# Patient Record
Sex: FEMALE | Race: WHITE | Employment: UNEMPLOYED | URBAN - METROPOLITAN AREA
[De-identification: names, ages, dates, MRNs, and addresses within clinical notes are randomized per-mention and may not be internally consistent; named-entity substitution may affect disease eponyms.]

---

## 2017-01-23 ENCOUNTER — OFFICE VISIT (OUTPATIENT)
Dept: OBGYN | Age: 27
End: 2017-01-23

## 2017-01-23 VITALS
SYSTOLIC BLOOD PRESSURE: 126 MMHG | WEIGHT: 234 LBS | DIASTOLIC BLOOD PRESSURE: 82 MMHG | BODY MASS INDEX: 39.95 KG/M2 | HEIGHT: 64 IN

## 2017-01-23 DIAGNOSIS — N92.6 IRREGULAR PERIODS/MENSTRUAL CYCLES: ICD-10-CM

## 2017-01-23 DIAGNOSIS — Z11.51 SCREENING FOR HUMAN PAPILLOMAVIRUS: ICD-10-CM

## 2017-01-23 DIAGNOSIS — Z01.419 WELL FEMALE EXAM WITH ROUTINE GYNECOLOGICAL EXAM: Primary | ICD-10-CM

## 2017-01-23 LAB — PAP SMEAR: NORMAL

## 2017-01-23 PROCEDURE — 99395 PREV VISIT EST AGE 18-39: CPT | Performed by: OBSTETRICS & GYNECOLOGY

## 2017-01-23 RX ORDER — NORGESTIMATE AND ETHINYL ESTRADIOL 0.25-0.035
1 KIT ORAL DAILY
Qty: 1 PACKET | Refills: 3 | Status: SHIPPED | OUTPATIENT
Start: 2017-01-23 | End: 2017-06-06 | Stop reason: SDUPTHER

## 2017-01-23 ASSESSMENT — ENCOUNTER SYMPTOMS
ABDOMINAL PAIN: 0
RECTAL PAIN: 0
ALLERGIC/IMMUNOLOGIC NEGATIVE: 1
VOMITING: 0
ANAL BLEEDING: 0
ABDOMINAL DISTENTION: 0
EYES NEGATIVE: 1
BLOOD IN STOOL: 0
CONSTIPATION: 0
DIARRHEA: 0
NAUSEA: 0
RESPIRATORY NEGATIVE: 1

## 2017-02-02 DIAGNOSIS — Z11.51 SCREENING FOR HUMAN PAPILLOMAVIRUS: ICD-10-CM

## 2017-02-02 DIAGNOSIS — Z01.419 WELL FEMALE EXAM WITH ROUTINE GYNECOLOGICAL EXAM: ICD-10-CM

## 2017-06-06 RX ORDER — NORGESTIMATE AND ETHINYL ESTRADIOL 0.25-0.035
1 KIT ORAL DAILY
Qty: 3 PACKET | Refills: 3 | Status: SHIPPED | OUTPATIENT
Start: 2017-06-06 | End: 2018-03-15 | Stop reason: SDUPTHER

## 2018-01-25 RX ORDER — NORGESTIMATE AND ETHINYL ESTRADIOL 0.25-0.035
1 KIT ORAL DAILY
Qty: 84 TABLET | Refills: 0 | OUTPATIENT
Start: 2018-01-25

## 2018-03-15 RX ORDER — NORGESTIMATE AND ETHINYL ESTRADIOL 0.25-0.035
1 KIT ORAL DAILY
Qty: 84 TABLET | Refills: 0 | Status: SHIPPED | OUTPATIENT
Start: 2018-03-15 | End: 2018-04-23 | Stop reason: SDUPTHER

## 2018-04-23 ENCOUNTER — OFFICE VISIT (OUTPATIENT)
Dept: OBGYN CLINIC | Age: 28
End: 2018-04-23
Payer: COMMERCIAL

## 2018-04-23 VITALS
WEIGHT: 212 LBS | SYSTOLIC BLOOD PRESSURE: 124 MMHG | BODY MASS INDEX: 36.19 KG/M2 | HEIGHT: 64 IN | DIASTOLIC BLOOD PRESSURE: 74 MMHG

## 2018-04-23 DIAGNOSIS — Z11.51 SPECIAL SCREENING EXAMINATION FOR HUMAN PAPILLOMAVIRUS (HPV): ICD-10-CM

## 2018-04-23 DIAGNOSIS — Z01.419 WOMEN'S ANNUAL ROUTINE GYNECOLOGICAL EXAMINATION: Primary | ICD-10-CM

## 2018-04-23 PROCEDURE — 99395 PREV VISIT EST AGE 18-39: CPT | Performed by: OBSTETRICS & GYNECOLOGY

## 2018-04-23 RX ORDER — NORGESTIMATE AND ETHINYL ESTRADIOL 0.25-0.035
1 KIT ORAL DAILY
Qty: 84 TABLET | Refills: 3 | Status: SHIPPED | OUTPATIENT
Start: 2018-04-23 | End: 2019-02-20 | Stop reason: SDUPTHER

## 2018-04-23 ASSESSMENT — ENCOUNTER SYMPTOMS
EYES NEGATIVE: 1
VOMITING: 0
ABDOMINAL PAIN: 0
CONSTIPATION: 0
ANAL BLEEDING: 0
NAUSEA: 0
DIARRHEA: 0
ALLERGIC/IMMUNOLOGIC NEGATIVE: 1
RESPIRATORY NEGATIVE: 1
BLOOD IN STOOL: 0
ABDOMINAL DISTENTION: 0
RECTAL PAIN: 0

## 2018-05-02 DIAGNOSIS — Z11.51 SPECIAL SCREENING EXAMINATION FOR HUMAN PAPILLOMAVIRUS (HPV): ICD-10-CM

## 2018-05-02 DIAGNOSIS — Z01.419 WOMEN'S ANNUAL ROUTINE GYNECOLOGICAL EXAMINATION: ICD-10-CM

## 2019-02-20 RX ORDER — NORGESTIMATE AND ETHINYL ESTRADIOL 0.25-0.035
1 KIT ORAL DAILY
Qty: 84 TABLET | Refills: 0 | Status: SHIPPED | OUTPATIENT
Start: 2019-02-20 | End: 2019-05-01 | Stop reason: SDUPTHER

## 2019-05-01 ENCOUNTER — OFFICE VISIT (OUTPATIENT)
Dept: OBGYN CLINIC | Age: 29
End: 2019-05-01
Payer: COMMERCIAL

## 2019-05-01 VITALS
HEIGHT: 64 IN | WEIGHT: 191 LBS | DIASTOLIC BLOOD PRESSURE: 72 MMHG | BODY MASS INDEX: 32.61 KG/M2 | SYSTOLIC BLOOD PRESSURE: 116 MMHG

## 2019-05-01 DIAGNOSIS — Z11.51 SCREENING FOR HUMAN PAPILLOMAVIRUS: ICD-10-CM

## 2019-05-01 DIAGNOSIS — Z01.419 WOMEN'S ANNUAL ROUTINE GYNECOLOGICAL EXAMINATION: Primary | ICD-10-CM

## 2019-05-01 PROCEDURE — 99395 PREV VISIT EST AGE 18-39: CPT | Performed by: OBSTETRICS & GYNECOLOGY

## 2019-05-01 RX ORDER — NORGESTIMATE AND ETHINYL ESTRADIOL 0.25-0.035
1 KIT ORAL DAILY
Qty: 84 TABLET | Refills: 3 | Status: SHIPPED | OUTPATIENT
Start: 2019-05-01 | End: 2019-06-19 | Stop reason: SDUPTHER

## 2019-05-01 ASSESSMENT — PATIENT HEALTH QUESTIONNAIRE - PHQ9
1. LITTLE INTEREST OR PLEASURE IN DOING THINGS: 0
SUM OF ALL RESPONSES TO PHQ QUESTIONS 1-9: 0
2. FEELING DOWN, DEPRESSED OR HOPELESS: 0
SUM OF ALL RESPONSES TO PHQ9 QUESTIONS 1 & 2: 0
SUM OF ALL RESPONSES TO PHQ QUESTIONS 1-9: 0

## 2019-05-01 ASSESSMENT — ENCOUNTER SYMPTOMS
EYES NEGATIVE: 1
RESPIRATORY NEGATIVE: 1
ANAL BLEEDING: 0
NAUSEA: 0
DIARRHEA: 0
RECTAL PAIN: 0
CONSTIPATION: 0
ALLERGIC/IMMUNOLOGIC NEGATIVE: 1
BLOOD IN STOOL: 0
ABDOMINAL DISTENTION: 0
ABDOMINAL PAIN: 0
VOMITING: 0

## 2019-05-01 NOTE — PROGRESS NOTES
Subjective:      Patient ID: Tiffany Torres is a 34 y.o. female    Annual exam.  No GYN complaints. Normal cycles. Pap performed. STD screening offered. Monthly SBE encouraged. F/U annual or prn. Vitals:  /72   Ht 5' 4\" (1.626 m)   Wt 191 lb (86.6 kg)   LMP 04/01/2019   BMI 32.79 kg/m²   Past Medical History:   Diagnosis Date    Migraines      Past Surgical History:   Procedure Laterality Date    LAPAROSCOPY  [id number]    DR [facility] TONSILLECTOMY       Allergies:  Ciprofloxacin  Current Outpatient Medications   Medication Sig Dispense Refill    norgestimate-ethinyl estradiol (ORTHO-CYCLEN) 0.25-35 MG-MCG per tablet Take 1 tablet by mouth daily 84 tablet 3    butalbital-acetaminophen-caffeine-codeine (FIORICET WITH CODEINE) -35-30 MG per capsule       betamethasone dipropionate (DIPROLENE) 0.05 % cream       halobetasol (ULTRAVATE) 0.05 % cream        No current facility-administered medications for this visit. Social History     Socioeconomic History    Marital status:      Spouse name: Not on file    Number of children: Not on file    Years of education: Not on file    Highest education level: Not on file   Occupational History    Not on file   Social Needs    Financial resource strain: Not on file    Food insecurity:     Worry: Not on file     Inability: Not on file    Transportation needs:     Medical: Not on file     Non-medical: Not on file   Tobacco Use    Smoking status: Former Smoker    Smokeless tobacco: Never Used   Substance and Sexual Activity    Alcohol use:  Yes     Alcohol/week: 0.0 oz     Comment: rarely    Drug use: No     Comment: Electronic cigs    Sexual activity: Yes     Partners: Male     Comment: Aygestin   Lifestyle    Physical activity:     Days per week: Not on file     Minutes per session: Not on file    Stress: Not on file   Relationships    Social connections:     Talks on phone: Not on file     Gets together: Not on file Attends Oriental orthodox service: Not on file     Active member of club or organization: Not on file     Attends meetings of clubs or organizations: Not on file     Relationship status: Not on file    Intimate partner violence:     Fear of current or ex partner: Not on file     Emotionally abused: Not on file     Physically abused: Not on file     Forced sexual activity: Not on file   Other Topics Concern    Not on file   Social History Narrative    Not on file     Family History   Problem Relation Age of Onset    Breast Cancer Mother     Breast Cancer Maternal Aunt     Cancer Maternal Aunt         bone    Cervical Cancer Maternal Aunt     Breast Cancer Maternal Grandmother     No Known Problems Paternal Grandfather     No Known Problems Paternal Grandmother     No Known Problems Maternal Grandfather     No Known Problems Father     No Known Problems Brother     No Known Problems Sister     No Known Problems Other     Colon Cancer Neg Hx     Diabetes Neg Hx     Eclampsia Neg Hx     Hypertension Neg Hx     Ovarian Cancer Neg Hx      Labor Neg Hx     Spont Abortions Neg Hx     Stroke Neg Hx        Review of Systems   Constitutional: Negative. Negative for activity change, appetite change, chills, diaphoresis, fatigue, fever and unexpected weight change. HENT: Negative. Eyes: Negative. Respiratory: Negative. Cardiovascular: Negative. Gastrointestinal: Negative for abdominal distention, abdominal pain, anal bleeding, blood in stool, constipation, diarrhea, nausea, rectal pain and vomiting. Endocrine: Negative. Genitourinary: Negative for decreased urine volume, difficulty urinating, dyspareunia, dysuria, enuresis, flank pain, frequency, genital sores, hematuria, menstrual problem, pelvic pain, urgency, vaginal bleeding, vaginal discharge and vaginal pain. Musculoskeletal: Negative. Skin: Negative. Allergic/Immunologic: Negative. Neurological: Negative. to person, place, and time. Skin: Skin is warm and dry. No rash noted. No erythema. No pallor. Psychiatric: She has a normal mood and affect. Her behavior is normal. Judgment and thought content normal.       Assessment:       Diagnosis Orders   1. Women's annual routine gynecological examination  PAP SMEAR   2. Screening for human papillomavirus          Plan:      Medications placedthis encounter:  Orders Placed This Encounter   Medications    norgestimate-ethinyl estradiol (ORTHO-CYCLEN) 0.25-35 MG-MCG per tablet     Sig: Take 1 tablet by mouth daily     Dispense:  84 tablet     Refill:  3         Orders placedthis encounter:  Orders Placed This Encounter   Procedures    PAP SMEAR     Standing Status:   Future     Standing Expiration Date:   5/1/2020     Order Specific Question:   Collection Type     Answer: Thin Prep     Order Specific Question:   Prior Abnormal Pap Test     Answer:   No     Order Specific Question:   Screening or Diagnostic     Answer:   Screening     Order Specific Question:   HPV Requested?      Answer:   Yes     Order Specific Question:   High Risk Patient     Answer:   N/A         Follow up:  Return in about 1 year (around 5/1/2020) for Annual.

## 2019-05-01 NOTE — PROGRESS NOTES
The patient was asked if she would like a chaperone present for her intimate exam. She  Declined the chaperone.  Talita Gusman

## 2019-05-10 DIAGNOSIS — Z01.419 WOMEN'S ANNUAL ROUTINE GYNECOLOGICAL EXAMINATION: ICD-10-CM

## 2019-05-10 DIAGNOSIS — Z11.51 SCREENING FOR HUMAN PAPILLOMAVIRUS: ICD-10-CM

## 2019-05-10 NOTE — LETTER
[facility] [employer] [address]  Phone: [phone]  Fax: [phone]    Uma Castellanos MD        May 13, 2019    [address] [address]      Dear Marya Rojas:    The results of your most recent Pap smear are normal. This means that no cancerous or precancerous cells were seen. We recommend that you come back in 1 year for your next routine Pap smear. If you have any questions or concerns, please don't hesitate to call.     Sincerely,        Uma Castellanos MD

## 2019-06-19 RX ORDER — NORGESTIMATE AND ETHINYL ESTRADIOL 0.25-0.035
1 KIT ORAL DAILY
Qty: 84 TABLET | Refills: 3 | Status: SHIPPED | OUTPATIENT
Start: 2019-06-19 | End: 2020-10-28

## 2020-08-15 ENCOUNTER — HOSPITAL ENCOUNTER (EMERGENCY)
Age: 30
Discharge: HOME | End: 2020-08-15
Attending: EMERGENCY MEDICINE
Payer: MEDICAID

## 2020-08-15 ENCOUNTER — APPOINTMENT (OUTPATIENT)
Dept: ULTRASOUND IMAGING | Age: 30
End: 2020-08-15
Payer: MEDICAID

## 2020-08-15 VITALS
HEART RATE: 109 BPM | OXYGEN SATURATION: 100 % | SYSTOLIC BLOOD PRESSURE: 103 MMHG | DIASTOLIC BLOOD PRESSURE: 75 MMHG | RESPIRATION RATE: 19 BRPM | HEIGHT: 64 IN | BODY MASS INDEX: 32.44 KG/M2 | WEIGHT: 190 LBS | TEMPERATURE: 98.2 F

## 2020-08-15 LAB
ABO/RH: NORMAL
ALBUMIN SERPL-MCNC: 4.3 G/DL (ref 3.5–4.6)
ALP BLD-CCNC: 70 U/L (ref 40–130)
ALT SERPL-CCNC: 10 U/L (ref 0–33)
ANION GAP SERPL CALCULATED.3IONS-SCNC: 11 MEQ/L (ref 9–15)
ANTIBODY SCREEN: NORMAL
AST SERPL-CCNC: 11 U/L (ref 0–35)
BACTERIA: NEGATIVE /HPF
BASOPHILS ABSOLUTE: 0.1 K/UL (ref 0–0.2)
BASOPHILS RELATIVE PERCENT: 1 %
BILIRUB SERPL-MCNC: <0.2 MG/DL (ref 0.2–0.7)
BILIRUBIN URINE: NEGATIVE
BLOOD, URINE: ABNORMAL
BUN BLDV-MCNC: 9 MG/DL (ref 6–20)
CALCIUM SERPL-MCNC: 8.8 MG/DL (ref 8.5–9.9)
CHLORIDE BLD-SCNC: 102 MEQ/L (ref 95–107)
CLARITY: CLEAR
CO2: 24 MEQ/L (ref 20–31)
COLOR: ABNORMAL
CREAT SERPL-MCNC: 0.56 MG/DL (ref 0.5–0.9)
EOSINOPHILS ABSOLUTE: 0.1 K/UL (ref 0–0.7)
EOSINOPHILS RELATIVE PERCENT: 0.8 %
EPITHELIAL CELLS, UA: ABNORMAL /HPF (ref 0–5)
GFR AFRICAN AMERICAN: >60
GFR NON-AFRICAN AMERICAN: >60
GLOBULIN: 2.4 G/DL (ref 2.3–3.5)
GLUCOSE BLD-MCNC: 99 MG/DL (ref 70–99)
GLUCOSE URINE: NEGATIVE MG/DL
GONADOTROPIN, CHORIONIC (HCG) QUANT: 1684 MIU/ML
HCT VFR BLD CALC: 42 % (ref 37–47)
HEMOGLOBIN: 14.3 G/DL (ref 12–16)
HYALINE CASTS: ABNORMAL /HPF (ref 0–5)
KETONES, URINE: NEGATIVE MG/DL
LEUKOCYTE ESTERASE, URINE: ABNORMAL
LYMPHOCYTES ABSOLUTE: 2.3 K/UL (ref 1–4.8)
LYMPHOCYTES RELATIVE PERCENT: 29.1 %
MCH RBC QN AUTO: 28.9 PG (ref 27–31.3)
MCHC RBC AUTO-ENTMCNC: 34.2 % (ref 33–37)
MCV RBC AUTO: 84.6 FL (ref 82–100)
MONOCYTES ABSOLUTE: 0.6 K/UL (ref 0.2–0.8)
MONOCYTES RELATIVE PERCENT: 6.9 %
NEUTROPHILS ABSOLUTE: 5 K/UL (ref 1.4–6.5)
NEUTROPHILS RELATIVE PERCENT: 62.2 %
NITRITE, URINE: NEGATIVE
PDW BLD-RTO: 13.2 % (ref 11.5–14.5)
PH UA: 6 (ref 5–9)
PLATELET # BLD: 246 K/UL (ref 130–400)
POTASSIUM SERPL-SCNC: 4 MEQ/L (ref 3.4–4.9)
PROTEIN UA: ABNORMAL MG/DL
RBC # BLD: 4.96 M/UL (ref 4.2–5.4)
RBC UA: ABNORMAL /HPF (ref 0–5)
SODIUM BLD-SCNC: 137 MEQ/L (ref 135–144)
SPECIFIC GRAVITY UA: 1 (ref 1–1.03)
TOTAL PROTEIN: 6.7 G/DL (ref 6.3–8)
UROBILINOGEN, URINE: 0.2 E.U./DL
WBC # BLD: 8 K/UL (ref 4.8–10.8)
WBC UA: ABNORMAL /HPF (ref 0–5)

## 2020-08-15 PROCEDURE — 99284 EMERGENCY DEPT VISIT MOD MDM: CPT

## 2020-08-15 PROCEDURE — 80053 COMPREHEN METABOLIC PANEL: CPT

## 2020-08-15 PROCEDURE — 86900 BLOOD TYPING SEROLOGIC ABO: CPT

## 2020-08-15 PROCEDURE — 85025 COMPLETE CBC W/AUTO DIFF WBC: CPT

## 2020-08-15 PROCEDURE — 81001 URINALYSIS AUTO W/SCOPE: CPT

## 2020-08-15 PROCEDURE — 86901 BLOOD TYPING SEROLOGIC RH(D): CPT

## 2020-08-15 PROCEDURE — 86850 RBC ANTIBODY SCREEN: CPT

## 2020-08-15 PROCEDURE — 76801 OB US < 14 WKS SINGLE FETUS: CPT

## 2020-08-15 PROCEDURE — 76817 TRANSVAGINAL US OBSTETRIC: CPT

## 2020-08-15 PROCEDURE — 84702 CHORIONIC GONADOTROPIN TEST: CPT

## 2020-08-15 PROCEDURE — 36415 COLL VENOUS BLD VENIPUNCTURE: CPT

## 2020-08-15 RX ORDER — 0.9 % SODIUM CHLORIDE 0.9 %
1000 INTRAVENOUS SOLUTION INTRAVENOUS ONCE
Status: DISCONTINUED | OUTPATIENT
Start: 2020-08-15 | End: 2020-08-15 | Stop reason: HOSPADM

## 2020-08-15 RX ORDER — ACETAMINOPHEN 500 MG
1000 TABLET ORAL ONCE
Status: DISCONTINUED | OUTPATIENT
Start: 2020-08-15 | End: 2020-08-15 | Stop reason: HOSPADM

## 2020-08-15 ASSESSMENT — PAIN DESCRIPTION - ORIENTATION: ORIENTATION: LOWER

## 2020-08-15 ASSESSMENT — ENCOUNTER SYMPTOMS
COUGH: 0
SHORTNESS OF BREATH: 0
NAUSEA: 0
ABDOMINAL PAIN: 1
SORE THROAT: 0
VOMITING: 0
BACK PAIN: 0
DIARRHEA: 0

## 2020-08-15 ASSESSMENT — PAIN DESCRIPTION - LOCATION: LOCATION: ABDOMEN

## 2020-08-15 ASSESSMENT — PAIN SCALES - GENERAL: PAINLEVEL_OUTOF10: 5

## 2020-08-15 ASSESSMENT — PAIN DESCRIPTION - PAIN TYPE: TYPE: ACUTE PAIN

## 2020-08-15 ASSESSMENT — PAIN DESCRIPTION - DESCRIPTORS: DESCRIPTORS: CRAMPING

## 2020-08-15 NOTE — ED PROVIDER NOTES
 Cancer Maternal Aunt         bone    Cervical Cancer Maternal Aunt     Breast Cancer Maternal Grandmother     No Known Problems Paternal Grandfather     No Known Problems Paternal Grandmother     No Known Problems Maternal Grandfather     No Known Problems Father     No Known Problems Brother     No Known Problems Sister     No Known Problems Other     Colon Cancer Neg Hx     Diabetes Neg Hx     Eclampsia Neg Hx     Hypertension Neg Hx     Ovarian Cancer Neg Hx      Labor Neg Hx     Spont Abortions Neg Hx     Stroke Neg Hx           SOCIAL HISTORY       Social History     Socioeconomic History    Marital status:      Spouse name: None    Number of children: None    Years of education: None    Highest education level: None   Occupational History    None   Social Needs    Financial resource strain: None    Food insecurity     Worry: None     Inability: None    Transportation needs     Medical: None     Non-medical: None   Tobacco Use    Smoking status: Former Smoker     Types: Cigarettes    Smokeless tobacco: Former User   Substance and Sexual Activity    Alcohol use:  Yes     Alcohol/week: 0.0 standard drinks     Comment: rarely    Drug use: No     Comment: Electronic cigs    Sexual activity: Yes     Partners: Male     Comment: Aygestin   Lifestyle    Physical activity     Days per week: None     Minutes per session: None    Stress: None   Relationships    Social connections     Talks on phone: None     Gets together: None     Attends Muslim service: None     Active member of club or organization: None     Attends meetings of clubs or organizations: None     Relationship status: None    Intimate partner violence     Fear of current or ex partner: None     Emotionally abused: None     Physically abused: None     Forced sexual activity: None   Other Topics Concern    None   Social History Narrative    None         PHYSICAL EXAM       ED Triage Vitals [08/15/20 1602]   BP Temp Temp src Pulse Resp SpO2 Height Weight   103/75 98.2 °F (36.8 °C) -- 109 19 100 % 5' 4\" (1.626 m) 190 lb (86.2 kg)       Physical Exam  Vitals signs and nursing note reviewed. Constitutional:       Appearance: She is well-developed. HENT:      Head: Normocephalic. Right Ear: External ear normal.      Left Ear: External ear normal.   Eyes:      Conjunctiva/sclera: Conjunctivae normal.      Pupils: Pupils are equal, round, and reactive to light. Neck:      Musculoskeletal: Normal range of motion and neck supple. Cardiovascular:      Rate and Rhythm: Normal rate and regular rhythm. Heart sounds: Normal heart sounds. Pulmonary:      Effort: Pulmonary effort is normal.      Breath sounds: Normal breath sounds. Abdominal:      Comments: Soft, nondistended. Mild tenderness to palpation in lower abdomen. No rebound, guarding, peritoneal signs. Musculoskeletal: Normal range of motion. Skin:     General: Skin is warm and dry. Neurological:      Mental Status: She is alert and oriented to person, place, and time. Psychiatric:         Mood and Affect: Mood normal.           MDM  33 yo female presents to the ED with ab cramping, vaginal bleeding. Pt given PO tylenol in the ED. Labs remarkable for HCG 1684. UA shows blood. US shows single IUP at 5 weeks 2 days. Pt reassessed and doing well. Pt educated about vaginal bleeding and ab cramping in pregnancy. At this point, it is too early to tell if the patient has a viable pregnancy vs early miscarriage. Pt educated about both. Pt has tylenol and PNV in the ED. Pt will f/u with ob/gyn next for serial HCGs. Pt understands plan. FINAL IMPRESSION      1.  Vaginal bleeding in pregnancy          DISPOSITION/PLAN   DISPOSITION Decision To Discharge 08/15/2020 06:09:42 PM        DISCHARGE MEDICATIONS:  [unfilled]         Paulina Vela MD(electronically signed)  Attending Emergency Physician            Paulina Vela MD  08/15/20 1814       Bhanu Moore MD  08/15/20 3608

## 2020-08-17 ENCOUNTER — TELEPHONE (OUTPATIENT)
Dept: OBGYN CLINIC | Age: 30
End: 2020-08-17

## 2020-08-17 LAB — GONADOTROPIN, CHORIONIC (HCG) QUANT: 890.6 MIU/ML

## 2020-08-17 NOTE — TELEPHONE ENCOUNTER
Pt was in the ER on 8-15-20 for bleeding in earlier pregnancy. Pt's HCG was 1684 and US showed a yolk sac and 2 cm small fetal pole with GA as 5 weeks 2 days. Please advise if repeat HCG can be ordered.

## 2020-08-18 ENCOUNTER — TELEPHONE (OUTPATIENT)
Dept: OBGYN CLINIC | Age: 30
End: 2020-08-18

## 2020-08-18 NOTE — TELEPHONE ENCOUNTER
----- Message from Ilya Ruth MD sent at 8/17/2020  3:57 PM EDT -----  Pt with decreasing hcg level and likely miscarriage.  PT should monitor VB and f/u 1-2wks

## 2020-08-31 ENCOUNTER — OFFICE VISIT (OUTPATIENT)
Dept: OBGYN CLINIC | Age: 30
End: 2020-08-31
Payer: MEDICAID

## 2020-08-31 VITALS — DIASTOLIC BLOOD PRESSURE: 72 MMHG | SYSTOLIC BLOOD PRESSURE: 118 MMHG | WEIGHT: 193 LBS | BODY MASS INDEX: 33.13 KG/M2

## 2020-08-31 PROCEDURE — 99213 OFFICE O/P EST LOW 20 MIN: CPT | Performed by: OBSTETRICS & GYNECOLOGY

## 2020-08-31 ASSESSMENT — PATIENT HEALTH QUESTIONNAIRE - PHQ9
2. FEELING DOWN, DEPRESSED OR HOPELESS: 0
1. LITTLE INTEREST OR PLEASURE IN DOING THINGS: 0
SUM OF ALL RESPONSES TO PHQ QUESTIONS 1-9: 0
SUM OF ALL RESPONSES TO PHQ9 QUESTIONS 1 & 2: 0
SUM OF ALL RESPONSES TO PHQ QUESTIONS 1-9: 0

## 2020-08-31 NOTE — PROGRESS NOTES
SUBJECTIVE:   34 y.o.   female here to discuss recent SAB. PT with early IUP with decreasing hcg levels. PT then with cramping and bleeding with passage of clots. Pt states her VB has since resolved. Review of Systems:  General ROS: negative  Respiratory ROS: no cough, shortness of breath, or wheezing  Cardiovascular ROS: no chest pain or dyspnea on exertion  Gastrointestinal ROS: no abdominal pain, change in bowel habits, or black or bloody stools  Genito-Urinary ROS: no dysuria, trouble voiding, or hematuria    OBJECTIVE:   /72   Wt 193 lb (87.5 kg)   BMI 33.13 kg/m²     Physical Exam:  GEN: She appears well, afebrile. HEENT: normal cephalic, atraumatic  CVS: regular rate and rhythm  ABDOMEN: benign, soft, nontender, no masses. MUSCULOSKELETAL: normal gait, no masses  SKIN: normal texture and tone, no lesions    ASSESSMENT:   Spontaneous miscarriage    PLAN:   Natural h/o SAB discussed with pt. Pt interested in pregnancy in the near future and pt advised to wait until next menses before trying for pregnancy. PT to have early serial hcg levels with positive home UPT.

## 2020-10-16 ENCOUNTER — NURSE ONLY (OUTPATIENT)
Dept: OBGYN CLINIC | Age: 30
End: 2020-10-16
Payer: COMMERCIAL

## 2020-10-16 DIAGNOSIS — Z32.01 PREGNANCY TEST POSITIVE: ICD-10-CM

## 2020-10-16 LAB — GONADOTROPIN, CHORIONIC (HCG) QUANT: 1424 MIU/ML

## 2020-10-16 PROCEDURE — 36415 COLL VENOUS BLD VENIPUNCTURE: CPT | Performed by: OBSTETRICS & GYNECOLOGY

## 2020-10-17 ENCOUNTER — PATIENT MESSAGE (OUTPATIENT)
Dept: OBGYN CLINIC | Age: 30
End: 2020-10-17

## 2020-10-19 ENCOUNTER — NURSE ONLY (OUTPATIENT)
Dept: OBGYN CLINIC | Age: 30
End: 2020-10-19
Payer: COMMERCIAL

## 2020-10-19 DIAGNOSIS — Z32.01 PREGNANCY TEST POSITIVE: ICD-10-CM

## 2020-10-19 LAB — GONADOTROPIN, CHORIONIC (HCG) QUANT: 4399 MIU/ML

## 2020-10-19 PROCEDURE — 36415 COLL VENOUS BLD VENIPUNCTURE: CPT | Performed by: OBSTETRICS & GYNECOLOGY

## 2020-10-19 NOTE — TELEPHONE ENCOUNTER
Patient made aware of results, and appointment scheduled with [facility].

## 2020-10-20 ENCOUNTER — TELEPHONE (OUTPATIENT)
Dept: OBGYN CLINIC | Age: 30
End: 2020-10-20

## 2020-10-20 NOTE — TELEPHONE ENCOUNTER
----- Message from Nicolette Headley MD sent at 10/20/2020  7:42 AM EDT -----  Pt with rising hcg levels, pt should have US to confirm IUP

## 2020-10-28 ENCOUNTER — OFFICE VISIT (OUTPATIENT)
Dept: OBGYN CLINIC | Age: 30
End: 2020-10-28
Payer: COMMERCIAL

## 2020-10-28 VITALS
HEIGHT: 64 IN | SYSTOLIC BLOOD PRESSURE: 98 MMHG | WEIGHT: 195 LBS | BODY MASS INDEX: 33.29 KG/M2 | DIASTOLIC BLOOD PRESSURE: 70 MMHG

## 2020-10-28 DIAGNOSIS — Z11.3 SCREENING FOR STD (SEXUALLY TRANSMITTED DISEASE): ICD-10-CM

## 2020-10-28 DIAGNOSIS — Z32.01 PREGNANCY TEST POSITIVE: ICD-10-CM

## 2020-10-28 PROCEDURE — G8484 FLU IMMUNIZE NO ADMIN: HCPCS | Performed by: OBSTETRICS & GYNECOLOGY

## 2020-10-28 PROCEDURE — G8419 CALC BMI OUT NRM PARAM NOF/U: HCPCS | Performed by: OBSTETRICS & GYNECOLOGY

## 2020-10-28 PROCEDURE — 99213 OFFICE O/P EST LOW 20 MIN: CPT | Performed by: OBSTETRICS & GYNECOLOGY

## 2020-10-28 PROCEDURE — G8427 DOCREV CUR MEDS BY ELIG CLIN: HCPCS | Performed by: OBSTETRICS & GYNECOLOGY

## 2020-10-28 PROCEDURE — 1036F TOBACCO NON-USER: CPT | Performed by: OBSTETRICS & GYNECOLOGY

## 2020-10-28 NOTE — PROGRESS NOTES
SUBJECTIVE:   34 y.o.   female here to discuss positive pregnancy test. Pt denies any VB and pt without complaints today. Pt with h/o TSVD x 1. PT with recent SAB . US pending. Review of Systems:  General ROS: negative  Respiratory ROS: no cough, shortness of breath, or wheezing  Cardiovascular ROS: no chest pain or dyspnea on exertion  Gastrointestinal ROS: no abdominal pain, change in bowel habits, or black or bloody stools  Genito-Urinary ROS: no dysuria, trouble voiding, or hematuria    OBJECTIVE:   BP 98/70   Ht 5' 4\" (1.626 m)   Wt 195 lb (88.5 kg)   LMP 09/10/2020   BMI 33.47 kg/m²     Physical Exam:  GEN: She appears well, afebrile. HEENT: normal cephalic, atraumatic  CVS: regular rate and rhythm  ABDOMEN: benign, soft, nontender, no masses.   MUSCULOSKELETAL: normal gait, no masses  SKIN: normal texture and tone, no lesions    ASSESSMENT:   Positive pregnancy test    PLAN:   UPT positive  US pending  Pt transferring care to [address] [place] [address] [address] [address]

## 2020-10-30 LAB — URINE CULTURE, ROUTINE: NORMAL

## 2020-11-01 LAB
6-ACETYLMORPHINE: NOT DETECTED
7-AMINOCLONAZEPAM: NOT DETECTED
ALPHA-OH-ALPRAZOLAM: NOT DETECTED
ALPRAZOLAM: NOT DETECTED
AMPHETAMINE: NOT DETECTED
BARBITURATES: NOT DETECTED
BENZOYLECGONINE: NOT DETECTED
BUPRENORPHINE: NOT DETECTED
CARISOPRODOL: NOT DETECTED
CLONAZEPAM: NOT DETECTED
CODEINE: NOT DETECTED
CREATININE URINE: 76.1 MG/DL (ref 20–400)
DIAZEPAM: NOT DETECTED
EER PAIN MGT DRUG PANEL, HIGH RES/EMIT U: NORMAL
ETHYL GLUCURONIDE: NOT DETECTED
FENTANYL: NOT DETECTED
HYDROCODONE: NOT DETECTED
HYDROMORPHONE: NOT DETECTED
LORAZEPAM: NOT DETECTED
MARIJUANA METABOLITE: NOT DETECTED
MDA: NOT DETECTED
MDEA: NOT DETECTED
MDMA URINE: NOT DETECTED
MEPERIDINE: NOT DETECTED
METHADONE: NOT DETECTED
METHAMPHETAMINE: NOT DETECTED
METHYLPHENIDATE: NOT DETECTED
MIDAZOLAM: NOT DETECTED
MORPHINE: NOT DETECTED
NORBUPRENORPHINE, FREE: NOT DETECTED
NORDIAZEPAM: NOT DETECTED
NORFENTANYL: NOT DETECTED
NORHYDROCODONE, URINE: NOT DETECTED
NOROXYCODONE: NOT DETECTED
NOROXYMORPHONE, URINE: NOT DETECTED
OXAZEPAM: NOT DETECTED
OXYCODONE: NOT DETECTED
OXYMORPHONE: NOT DETECTED
PAIN MANAGEMENT DRUG PANEL: NORMAL
PCP: NOT DETECTED
PHENTERMINE: NOT DETECTED
PROPOXYPHENE: NOT DETECTED
TAPENTADOL, URINE: NOT DETECTED
TAPENTADOL-O-SULFATE, URINE: NOT DETECTED
TEMAZEPAM: NOT DETECTED
TRAMADOL: NOT DETECTED
ZOLPIDEM: NOT DETECTED

## 2020-11-02 LAB
SPECIMEN SOURCE: NORMAL
T. VAGINALIS AMPLIFIED: NEGATIVE

## 2020-11-03 LAB
C. TRACHOMATIS DNA ,URINE: NEGATIVE
N. GONORRHOEAE DNA, URINE: NEGATIVE

## 2020-11-06 ENCOUNTER — HOSPITAL ENCOUNTER (OUTPATIENT)
Dept: ULTRASOUND IMAGING | Age: 30
Discharge: HOME | End: 2020-11-08
Payer: COMMERCIAL

## 2020-11-06 PROCEDURE — 76801 OB US < 14 WKS SINGLE FETUS: CPT

## 2020-11-06 PROCEDURE — 76817 TRANSVAGINAL US OBSTETRIC: CPT

## 2023-03-20 ENCOUNTER — OFFICE VISIT (OUTPATIENT)
Dept: PRIMARY CARE | Facility: CLINIC | Age: 33
End: 2023-03-20
Payer: COMMERCIAL

## 2023-03-20 ENCOUNTER — TELEPHONE (OUTPATIENT)
Dept: PRIMARY CARE | Facility: CLINIC | Age: 33
End: 2023-03-20

## 2023-03-20 VITALS
DIASTOLIC BLOOD PRESSURE: 66 MMHG | SYSTOLIC BLOOD PRESSURE: 118 MMHG | OXYGEN SATURATION: 97 % | RESPIRATION RATE: 18 BRPM | BODY MASS INDEX: 32.96 KG/M2 | TEMPERATURE: 97.4 F | WEIGHT: 192 LBS

## 2023-03-20 DIAGNOSIS — J02.9 SORE THROAT: Primary | ICD-10-CM

## 2023-03-20 DIAGNOSIS — J06.9 UPPER RESPIRATORY TRACT INFECTION, UNSPECIFIED TYPE: Primary | ICD-10-CM

## 2023-03-20 LAB — POC RAPID STREP: NEGATIVE

## 2023-03-20 PROCEDURE — 87081 CULTURE SCREEN ONLY: CPT

## 2023-03-20 PROCEDURE — 87880 STREP A ASSAY W/OPTIC: CPT | Performed by: FAMILY MEDICINE

## 2023-03-20 PROCEDURE — 99214 OFFICE O/P EST MOD 30 MIN: CPT | Performed by: FAMILY MEDICINE

## 2023-03-20 RX ORDER — AMOXICILLIN 875 MG/1
875 TABLET, FILM COATED ORAL 2 TIMES DAILY
Qty: 20 TABLET | Refills: 0 | Status: SHIPPED | OUTPATIENT
Start: 2023-03-20 | End: 2023-03-30

## 2023-03-20 RX ORDER — ETONOGESTREL 68 MG/1
1 IMPLANT SUBCUTANEOUS ONCE
COMMUNITY
Start: 2021-08-04

## 2023-03-20 ASSESSMENT — ENCOUNTER SYMPTOMS
DYSURIA: 0
NAUSEA: 0
TROUBLE SWALLOWING: 1
FATIGUE: 1
COUGH: 0
NECK PAIN: 1
MYALGIAS: 1
CONSTIPATION: 0
SORE THROAT: 1
TREMORS: 1
HEMATURIA: 0
FEVER: 0
SHORTNESS OF BREATH: 0
VOMITING: 0
BLOOD IN STOOL: 0
DIARRHEA: 0
WHEEZING: 0

## 2023-03-20 NOTE — TELEPHONE ENCOUNTER
Her daughter was seen at  and tested positive for strep throat. She would like to know if antibiotic can be sent in  for her since she also has ST?

## 2023-03-20 NOTE — PROGRESS NOTES
Subjective   Patient ID: Sophia Leigh is a 32 y.o. female who presents for Sore Throat.    Daughter with strep, now with symptoms x 2-3 d    Sore Throat   This is a new problem. The current episode started yesterday. The problem has been unchanged. Associated symptoms include congestion, neck pain and trouble swallowing. Pertinent negatives include no coughing, diarrhea, ear discharge, ear pain, shortness of breath or vomiting. She has tried nothing for the symptoms.        Review of Systems   Constitutional:  Positive for fatigue. Negative for fever.   HENT:  Positive for congestion, sore throat and trouble swallowing. Negative for ear discharge and ear pain.    Respiratory:  Negative for cough, shortness of breath and wheezing.    Gastrointestinal:  Negative for blood in stool, constipation, diarrhea, nausea and vomiting.   Genitourinary:  Negative for dysuria and hematuria.   Musculoskeletal:  Positive for myalgias and neck pain.   Neurological:  Positive for tremors.       Objective   /66   Temp 36.3 °C (97.4 °F) (Temporal)   Resp 18   Wt 87.1 kg (192 lb)   SpO2 97%   BMI 32.96 kg/m²     Physical Exam  Constitutional:       Appearance: Normal appearance.   HENT:      Head: Normocephalic and atraumatic.      Right Ear: Tympanic membrane, ear canal and external ear normal.      Left Ear: Tympanic membrane, ear canal and external ear normal.      Nose: Nose normal.      Mouth/Throat:      Mouth: Mucous membranes are moist.      Pharynx: Posterior oropharyngeal erythema present.   Cardiovascular:      Rate and Rhythm: Normal rate and regular rhythm.      Heart sounds: Normal heart sounds.   Pulmonary:      Effort: Pulmonary effort is normal.      Breath sounds: Normal breath sounds.   Musculoskeletal:      Cervical back: Neck supple. Tenderness present.   Lymphadenopathy:      Cervical: No cervical adenopathy.   Skin:     General: Skin is warm and dry.   Neurological:      General: No focal deficit  present.      Mental Status: She is alert.         Assessment/Plan   Problem List Items Addressed This Visit          Other    Sore throat - Primary    Relevant Orders    POCT rapid strep A manually resulted (Completed)    Group A Streptococcus, Culture     Pt with st, exposed to strep, rst negative, will send b/up  May use otc meds as needed

## 2023-03-23 LAB — GROUP A STREP SCREEN, CULTURE: NORMAL

## 2023-05-15 ENCOUNTER — TELEPHONE (OUTPATIENT)
Dept: PRIMARY CARE | Facility: CLINIC | Age: 33
End: 2023-05-15
Payer: COMMERCIAL

## 2023-05-15 DIAGNOSIS — K04.7 DENTAL INFECTION: Primary | ICD-10-CM

## 2023-05-15 RX ORDER — AMOXICILLIN 875 MG/1
875 TABLET, FILM COATED ORAL 2 TIMES DAILY
Qty: 20 TABLET | Refills: 0 | Status: SHIPPED | OUTPATIENT
Start: 2023-05-15 | End: 2023-05-25

## 2024-01-31 ENCOUNTER — PATIENT MESSAGE (OUTPATIENT)
Dept: PRIMARY CARE | Facility: CLINIC | Age: 34
End: 2024-01-31
Payer: COMMERCIAL

## 2024-01-31 DIAGNOSIS — J06.9 UPPER RESPIRATORY TRACT INFECTION, UNSPECIFIED TYPE: Primary | ICD-10-CM

## 2024-01-31 RX ORDER — CEFDINIR 300 MG/1
300 CAPSULE ORAL 2 TIMES DAILY
Qty: 20 CAPSULE | Refills: 0 | Status: SHIPPED | OUTPATIENT
Start: 2024-01-31 | End: 2024-02-10

## 2024-01-31 RX ORDER — METHYLPREDNISOLONE 4 MG/1
TABLET ORAL
Qty: 21 TABLET | Refills: 0 | Status: SHIPPED | OUTPATIENT
Start: 2024-01-31 | End: 2024-02-07

## 2024-05-17 DIAGNOSIS — J06.9 UPPER RESPIRATORY TRACT INFECTION, UNSPECIFIED TYPE: Primary | ICD-10-CM

## 2024-05-17 RX ORDER — AMOXICILLIN 875 MG/1
875 TABLET, FILM COATED ORAL 2 TIMES DAILY
Qty: 20 TABLET | Refills: 0 | Status: SHIPPED | OUTPATIENT
Start: 2024-05-17 | End: 2024-05-27

## 2024-05-22 ENCOUNTER — OFFICE VISIT (OUTPATIENT)
Dept: OBSTETRICS AND GYNECOLOGY | Facility: CLINIC | Age: 34
End: 2024-05-22
Payer: COMMERCIAL

## 2024-05-22 VITALS
SYSTOLIC BLOOD PRESSURE: 124 MMHG | WEIGHT: 204.38 LBS | BODY MASS INDEX: 34.89 KG/M2 | HEIGHT: 64 IN | DIASTOLIC BLOOD PRESSURE: 68 MMHG

## 2024-05-22 DIAGNOSIS — Z12.4 SCREENING FOR CERVICAL CANCER: ICD-10-CM

## 2024-05-22 DIAGNOSIS — Z01.419 WELL WOMAN EXAM: Primary | ICD-10-CM

## 2024-05-22 PROCEDURE — 87624 HPV HI-RISK TYP POOLED RSLT: CPT

## 2024-05-22 PROCEDURE — 99385 PREV VISIT NEW AGE 18-39: CPT

## 2024-05-22 PROCEDURE — 88175 CYTOPATH C/V AUTO FLUID REDO: CPT

## 2024-05-23 ENCOUNTER — TELEPHONE (OUTPATIENT)
Dept: OBSTETRICS AND GYNECOLOGY | Facility: CLINIC | Age: 34
End: 2024-05-23

## 2024-05-23 NOTE — TELEPHONE ENCOUNTER
Patient called in and stated in their AVS they were supposed to get an STD screening and they did not. Patient wanted to know if they were able to get that done.

## 2024-05-24 DIAGNOSIS — Z11.3 ROUTINE SCREENING FOR STI (SEXUALLY TRANSMITTED INFECTION): Primary | ICD-10-CM

## 2024-05-28 ENCOUNTER — LAB (OUTPATIENT)
Dept: LAB | Facility: LAB | Age: 34
End: 2024-05-28
Payer: COMMERCIAL

## 2024-05-28 DIAGNOSIS — Z11.3 ROUTINE SCREENING FOR STI (SEXUALLY TRANSMITTED INFECTION): ICD-10-CM

## 2024-05-28 LAB
HBV SURFACE AG SERPL QL IA: NONREACTIVE
HCV AB SER QL: NONREACTIVE
HIV 1+2 AB+HIV1 P24 AG SERPL QL IA: NONREACTIVE
TREPONEMA PALLIDUM IGG+IGM AB [PRESENCE] IN SERUM OR PLASMA BY IMMUNOASSAY: NONREACTIVE

## 2024-05-28 PROCEDURE — 87491 CHLMYD TRACH DNA AMP PROBE: CPT

## 2024-05-28 PROCEDURE — 86780 TREPONEMA PALLIDUM: CPT

## 2024-05-28 PROCEDURE — 87591 N.GONORRHOEAE DNA AMP PROB: CPT

## 2024-05-28 PROCEDURE — 86803 HEPATITIS C AB TEST: CPT

## 2024-05-28 PROCEDURE — 87661 TRICHOMONAS VAGINALIS AMPLIF: CPT

## 2024-05-28 PROCEDURE — 87340 HEPATITIS B SURFACE AG IA: CPT

## 2024-05-28 PROCEDURE — 87389 HIV-1 AG W/HIV-1&-2 AB AG IA: CPT

## 2024-05-28 PROCEDURE — 36415 COLL VENOUS BLD VENIPUNCTURE: CPT

## 2024-05-29 ENCOUNTER — APPOINTMENT (OUTPATIENT)
Dept: OBSTETRICS AND GYNECOLOGY | Facility: CLINIC | Age: 34
End: 2024-05-29
Payer: COMMERCIAL

## 2024-05-29 LAB
C TRACH RRNA SPEC QL NAA+PROBE: NEGATIVE
N GONORRHOEA DNA SPEC QL PROBE+SIG AMP: NEGATIVE
T VAGINALIS RRNA SPEC QL NAA+PROBE: NEGATIVE

## 2024-06-03 LAB
CYTOLOGY CMNT CVX/VAG CYTO-IMP: NORMAL
HPV HR 12 DNA GENITAL QL NAA+PROBE: NEGATIVE
HPV HR GENOTYPES PNL CVX NAA+PROBE: NEGATIVE
HPV16 DNA SPEC QL NAA+PROBE: NEGATIVE
HPV18 DNA SPEC QL NAA+PROBE: NEGATIVE
LAB AP HPV GENOTYPE QUESTION: YES
LAB AP HPV HR: NORMAL
LABORATORY COMMENT REPORT: NORMAL
PATH REPORT.TOTAL CANCER: NORMAL

## 2024-06-21 ENCOUNTER — APPOINTMENT (OUTPATIENT)
Dept: OBSTETRICS AND GYNECOLOGY | Facility: CLINIC | Age: 34
End: 2024-06-21
Payer: COMMERCIAL

## 2024-06-21 VITALS
OXYGEN SATURATION: 98 % | SYSTOLIC BLOOD PRESSURE: 109 MMHG | HEART RATE: 78 BPM | HEIGHT: 64 IN | DIASTOLIC BLOOD PRESSURE: 75 MMHG | BODY MASS INDEX: 35.26 KG/M2 | WEIGHT: 206.5 LBS

## 2024-06-21 DIAGNOSIS — Z30.46 ENCOUNTER FOR NEXPLANON REMOVAL: ICD-10-CM

## 2024-06-21 DIAGNOSIS — Z30.09 ENCOUNTER FOR COUNSELING REGARDING CONTRACEPTION: Primary | ICD-10-CM

## 2024-06-21 PROCEDURE — 11982 REMOVE DRUG IMPLANT DEVICE: CPT | Performed by: STUDENT IN AN ORGANIZED HEALTH CARE EDUCATION/TRAINING PROGRAM

## 2024-06-21 RX ORDER — LIDOCAINE HYDROCHLORIDE 20 MG/ML
2 INJECTION, SOLUTION EPIDURAL; INFILTRATION; INTRACAUDAL; PERINEURAL ONCE
Status: COMPLETED | OUTPATIENT
Start: 2024-06-21 | End: 2024-06-21

## 2024-06-21 RX ORDER — NORETHINDRONE ACETATE AND ETHINYL ESTRADIOL 1MG-20(21)
1 KIT ORAL DAILY
Qty: 84 TABLET | Refills: 3 | Status: SHIPPED | OUTPATIENT
Start: 2024-06-21 | End: 2025-06-21

## 2024-10-15 DIAGNOSIS — J06.9 UPPER RESPIRATORY TRACT INFECTION, UNSPECIFIED TYPE: ICD-10-CM

## 2024-10-15 DIAGNOSIS — J06.9 UPPER RESPIRATORY TRACT INFECTION, UNSPECIFIED TYPE: Primary | ICD-10-CM

## 2024-10-15 RX ORDER — AMOXICILLIN 875 MG/1
875 TABLET, FILM COATED ORAL 2 TIMES DAILY
Qty: 20 TABLET | Refills: 0 | Status: SHIPPED | OUTPATIENT
Start: 2024-10-15 | End: 2024-10-15 | Stop reason: SDUPTHER

## 2024-10-15 RX ORDER — AMOXICILLIN 875 MG/1
875 TABLET, FILM COATED ORAL 2 TIMES DAILY
Qty: 20 TABLET | Refills: 0 | Status: SHIPPED | OUTPATIENT
Start: 2024-10-15 | End: 2024-10-25

## 2024-12-30 ENCOUNTER — PATIENT MESSAGE (OUTPATIENT)
Dept: PRIMARY CARE | Facility: CLINIC | Age: 34
End: 2024-12-30
Payer: COMMERCIAL

## 2024-12-30 DIAGNOSIS — N30.00 ACUTE CYSTITIS WITHOUT HEMATURIA: Primary | ICD-10-CM

## 2025-01-03 RX ORDER — SULFAMETHOXAZOLE AND TRIMETHOPRIM 800; 160 MG/1; MG/1
1 TABLET ORAL 2 TIMES DAILY
Qty: 20 TABLET | Refills: 0 | Status: SHIPPED | OUTPATIENT
Start: 2025-01-03 | End: 2025-01-13

## 2025-04-29 DIAGNOSIS — L30.9 ECZEMA, UNSPECIFIED TYPE: Primary | ICD-10-CM

## 2025-04-29 RX ORDER — CLOBETASOL PROPIONATE 0.5 MG/G
CREAM TOPICAL 2 TIMES DAILY
Qty: 60 G | Refills: 1 | Status: SHIPPED | OUTPATIENT
Start: 2025-04-29

## 2025-08-12 DIAGNOSIS — Z30.09 ENCOUNTER FOR COUNSELING REGARDING CONTRACEPTION: ICD-10-CM

## 2025-08-12 RX ORDER — NORETHINDRONE ACETATE AND ETHINYL ESTRADIOL 1MG-20(21)
1 KIT ORAL DAILY
Qty: 84 TABLET | Refills: 0 | Status: SHIPPED | OUTPATIENT
Start: 2025-08-12 | End: 2026-08-12

## 2025-09-29 ENCOUNTER — APPOINTMENT (OUTPATIENT)
Dept: OBSTETRICS AND GYNECOLOGY | Facility: CLINIC | Age: 35
End: 2025-09-29
Payer: COMMERCIAL